# Patient Record
Sex: FEMALE | Race: BLACK OR AFRICAN AMERICAN | Employment: FULL TIME | URBAN - METROPOLITAN AREA
[De-identification: names, ages, dates, MRNs, and addresses within clinical notes are randomized per-mention and may not be internally consistent; named-entity substitution may affect disease eponyms.]

---
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
## 2022-01-10 ENCOUNTER — HOSPITAL ENCOUNTER (EMERGENCY)
Facility: HOSPITAL | Age: 22
Discharge: HOME | End: 2022-01-10
Payer: MEDICAID

## 2022-01-10 VITALS
WEIGHT: 154 LBS | HEIGHT: 67 IN | HEART RATE: 85 BPM | BODY MASS INDEX: 24.17 KG/M2 | TEMPERATURE: 97 F | RESPIRATION RATE: 18 BRPM | OXYGEN SATURATION: 96 % | DIASTOLIC BLOOD PRESSURE: 73 MMHG | SYSTOLIC BLOOD PRESSURE: 132 MMHG

## 2022-01-10 DIAGNOSIS — J02.0 STREPTOCOCCAL SORE THROAT: Primary | ICD-10-CM

## 2022-01-10 LAB
B-HCG UR QL: NEGATIVE
S PYO AG THROAT QL: POSITIVE

## 2022-01-10 PROCEDURE — 87880 STREP A ASSAY W/OPTIC: CPT

## 2022-01-10 PROCEDURE — 81025 URINE PREGNANCY TEST: CPT

## 2022-01-10 PROCEDURE — 99283 EMERGENCY DEPT VISIT LOW MDM: CPT

## 2022-01-10 RX ORDER — AZITHROMYCIN 500 MG/1
500 TABLET, FILM COATED ORAL DAILY
Qty: 5 TABLET | Refills: 0 | Status: SHIPPED | OUTPATIENT
Start: 2022-01-10 | End: 2022-01-15

## 2022-01-10 RX ORDER — ACETAMINOPHEN 500 MG
1000 TABLET ORAL ONCE
Status: COMPLETED | OUTPATIENT
Start: 2022-01-10 | End: 2022-01-10

## 2022-01-11 NOTE — ED PROVIDER NOTES
Patient Seen in: [facility] AND [facility] Emergency Department      History   No chief complaint on file. Stated Complaint: sore throat     Subjective:   HPI    51-year-old female presents the emergency department for evaluation.   Patient states for the past external ear normal.      Nose: Nose normal.      Mouth/Throat:      Mouth: Mucous membranes are moist.      Pharynx: Oropharynx is clear. Posterior oropharyngeal erythema present.       Comments: No swelling  Eyes:      Extraocular Movements: Extraocular m sore throat  (primary encounter diagnosis)     Disposition:  Discharge  1/10/2022  7:08 pm    Follow-up:  Mo Mello  [address] [place] [address] [phone]    In 2 days  ER follow up          Medications Prescribed:  Current

## 2023-11-27 ENCOUNTER — HOSPITAL ENCOUNTER (EMERGENCY)
Facility: HOSPITAL | Age: 23
Discharge: HOME | End: 2023-11-27
Attending: EMERGENCY MEDICINE
Payer: MEDICAID

## 2023-11-27 ENCOUNTER — APPOINTMENT (OUTPATIENT)
Dept: GENERAL RADIOLOGY | Facility: HOSPITAL | Age: 23
End: 2023-11-27
Attending: EMERGENCY MEDICINE
Payer: MEDICAID

## 2023-11-27 VITALS
DIASTOLIC BLOOD PRESSURE: 72 MMHG | RESPIRATION RATE: 18 BRPM | OXYGEN SATURATION: 99 % | HEART RATE: 86 BPM | TEMPERATURE: 97 F | SYSTOLIC BLOOD PRESSURE: 126 MMHG

## 2023-11-27 DIAGNOSIS — J18.9 ATYPICAL PNEUMONIA: Primary | ICD-10-CM

## 2023-11-27 LAB — B-HCG UR QL: NEGATIVE

## 2023-11-27 PROCEDURE — 71045 X-RAY EXAM CHEST 1 VIEW: CPT | Performed by: EMERGENCY MEDICINE

## 2023-11-27 PROCEDURE — 99283 EMERGENCY DEPT VISIT LOW MDM: CPT

## 2023-11-27 PROCEDURE — 81025 URINE PREGNANCY TEST: CPT

## 2023-11-27 PROCEDURE — 99284 EMERGENCY DEPT VISIT MOD MDM: CPT

## 2023-11-27 RX ORDER — DOXYCYCLINE HYCLATE 100 MG/1
100 CAPSULE ORAL 2 TIMES DAILY
Qty: 14 CAPSULE | Refills: 0 | Status: SHIPPED | OUTPATIENT
Start: 2023-11-27 | End: 2023-12-04

## 2023-11-27 RX ORDER — PREDNISONE 20 MG/1
40 TABLET ORAL DAILY
Qty: 10 TABLET | Refills: 0 | Status: SHIPPED | OUTPATIENT
Start: 2023-11-27 | End: 2023-12-02

## 2023-11-27 NOTE — ED INITIAL ASSESSMENT (HPI)
Patient arrives to ER for evaluation of Cough. Patient tested positive for Covid-19 on 11/21. Patient states the cough has been consistent and she noticed today some bright red blood in her sputum. Endorses throat irritation.

## 2024-01-28 ENCOUNTER — HOSPITAL ENCOUNTER (EMERGENCY)
Facility: HOSPITAL | Age: 24
Discharge: HOME | End: 2024-01-28
Attending: EMERGENCY MEDICINE
Payer: MEDICAID

## 2024-01-28 VITALS
RESPIRATION RATE: 20 BRPM | SYSTOLIC BLOOD PRESSURE: 117 MMHG | HEIGHT: 67 IN | HEART RATE: 89 BPM | TEMPERATURE: 98 F | WEIGHT: 166 LBS | DIASTOLIC BLOOD PRESSURE: 70 MMHG | BODY MASS INDEX: 26.06 KG/M2 | OXYGEN SATURATION: 99 %

## 2024-01-28 DIAGNOSIS — M54.50 BACK PAIN, LUMBOSACRAL: ICD-10-CM

## 2024-01-28 DIAGNOSIS — N30.90 CYSTITIS: Primary | ICD-10-CM

## 2024-01-28 LAB
B-HCG UR QL: NEGATIVE
BILIRUB UR QL: NEGATIVE
GLUCOSE UR-MCNC: NORMAL MG/DL
HGB UR QL STRIP.AUTO: NEGATIVE
KETONES UR-MCNC: NEGATIVE MG/DL
LEUKOCYTE ESTERASE UR QL STRIP.AUTO: NEGATIVE
NITRITE UR QL STRIP.AUTO: NEGATIVE
PH UR: 8 [PH] (ref 5–8)
PROT UR-MCNC: NEGATIVE MG/DL
SP GR UR STRIP: 1.02 (ref 1–1.03)
UROBILINOGEN UR STRIP-ACNC: NORMAL

## 2024-01-28 PROCEDURE — 99284 EMERGENCY DEPT VISIT MOD MDM: CPT

## 2024-01-28 PROCEDURE — 81001 URINALYSIS AUTO W/SCOPE: CPT | Performed by: EMERGENCY MEDICINE

## 2024-01-28 PROCEDURE — 81025 URINE PREGNANCY TEST: CPT

## 2024-01-28 PROCEDURE — 81001 URINALYSIS AUTO W/SCOPE: CPT

## 2024-01-28 PROCEDURE — 99283 EMERGENCY DEPT VISIT LOW MDM: CPT

## 2024-01-28 RX ORDER — NITROFURANTOIN 25; 75 MG/1; MG/1
100 CAPSULE ORAL 2 TIMES DAILY
Qty: 6 CAPSULE | Refills: 0 | Status: SHIPPED | OUTPATIENT
Start: 2024-01-28 | End: 2024-01-31

## 2024-01-28 RX ORDER — KETOROLAC TROMETHAMINE 10 MG/1
10 TABLET, FILM COATED ORAL EVERY 6 HOURS PRN
Qty: 15 TABLET | Refills: 0 | Status: SHIPPED | OUTPATIENT
Start: 2024-01-28 | End: 2024-02-04

## 2024-01-28 NOTE — ED INITIAL ASSESSMENT (HPI)
Pt reports having lower back pain and frequent urination that started 2 weeks ago. Pt report no fever,N/V.

## 2024-01-29 NOTE — ED PROVIDER NOTES
Patient Seen in: [facility] Emergency Department    History     Chief Complaint   Patient presents with    Back Pain    Urinary Symptoms     Stated Complaint: back pain, frequent urination    HPI    Patient complains of urinary frequency, urgency  that began couple days ago.  Patient denies fever or chills, no vomiting, co b back pain.  Patient denies abdominal pain.     Past Medical History:   Diagnosis Date    Asthma        Past Surgical History:   Procedure Laterality Date    FRACTURE SURGERY              History reviewed. No pertinent family history.    Social History     Socioeconomic History    Marital status: Single   Tobacco Use    Smoking status: Never    Smokeless tobacco: Never   Vaping Use    Vaping Use: Every day   Substance and Sexual Activity    Alcohol use: Yes     Comment: Socially    Drug use: Yes     Types: Cannabis     Comment: socially       Review of Systems    Positive for stated complaint: back pain, frequent urination  Other systems are as noted in HPI.  Constitutional and vital signs reviewed.      All other systems reviewed and negative except as noted above.    PSFH elements reviewed from today and agreed except as otherwise stated in HPI.    Physical Exam     ED Triage Vitals [01/28/24 1712]   /70   Pulse 89   Resp 20   Temp 97.9 °F (36.6 °C)   Temp src Temporal   SpO2 99 %   O2 Device None (Room air)       Current:/70   Pulse 89   Temp 97.9 °F (36.6 °C) (Temporal)   Resp 20   Ht 170.2 cm (5' 7\")   Wt 75.3 kg   SpO2 99%   BMI 26.00 kg/m²   GENERAL: alert, no distress  SKIN: good skin turgor, no rashes  HEENT: atraumatic, normocephalic, ears and throat are clear  EYES: sclera non icteric, conjunctiva non-injected  NECK: supple,  LUNGS:no resp distress  CARDIO:regular rate  EXTREMITIES: no cyanosis, clubbing or edema  BACK: no CVA tenderness  GI: soft, mild suprapubic tenderness, no guarding      ED Course     Labs Reviewed   URINALYSIS WITH CULTURE REFLEX -  Abnormal; Notable for the following components:       Result Value    Clarity Urine Ex.Turbid (*)     Squamous Epi. Cells Few (*)     All other components within normal limits   POCT PREGNANCY URINE - Normal       MDM       Medical Decision Making  Problems Addressed:  Back pain, lumbosacral: acute illness or injury  Cystitis: acute illness or injury    Amount and/or Complexity of Data Reviewed  Labs: ordered. Decision-making details documented in ED Course.  Discussion of management or test interpretation with external provider(s): Tylenol, motrin recommended.      Risk  OTC drugs.  Prescription drug management.            Disposition and Plan     Clinical Impression:  1. Cystitis    2. Back pain, lumbosacral        Disposition:  Discharge    Follow-up:  Gamaliel Pozo  [address] [address]  [phone]    Follow up        Medications Prescribed:  Discharge Medication List as of 1/28/2024  6:21 PM        START taking these medications    Details   Ketorolac Tromethamine 10 MG Oral Tab Take 1 tablet (10 mg total) by mouth every 6 (six) hours as needed for Pain., Normal, Disp-15 tablet, R-0      nitrofurantoin monohydrate macro 100 MG Oral Cap Take 1 capsule (100 mg total) by mouth 2 (two) times daily for 3 days., Normal, Disp-6 capsule, R-0

## 2025-04-02 ENCOUNTER — HOSPITAL ENCOUNTER (EMERGENCY)
Facility: HOSPITAL | Age: 25
Discharge: HOME | End: 2025-04-02
Attending: EMERGENCY MEDICINE
Payer: COMMERCIAL

## 2025-04-02 ENCOUNTER — APPOINTMENT (OUTPATIENT)
Dept: GENERAL RADIOLOGY | Facility: HOSPITAL | Age: 25
End: 2025-04-02
Payer: COMMERCIAL

## 2025-04-02 VITALS
DIASTOLIC BLOOD PRESSURE: 74 MMHG | HEART RATE: 80 BPM | WEIGHT: 184 LBS | RESPIRATION RATE: 18 BRPM | SYSTOLIC BLOOD PRESSURE: 127 MMHG | HEIGHT: 67 IN | OXYGEN SATURATION: 100 % | BODY MASS INDEX: 28.88 KG/M2 | TEMPERATURE: 99 F

## 2025-04-02 DIAGNOSIS — J06.9 UPPER RESPIRATORY TRACT INFECTION, UNSPECIFIED TYPE: ICD-10-CM

## 2025-04-02 DIAGNOSIS — J45.21 MILD INTERMITTENT ASTHMA WITH EXACERBATION (HCC): Primary | ICD-10-CM

## 2025-04-02 LAB
ATRIAL RATE: 71 BPM
FLUAV + FLUBV RNA SPEC NAA+PROBE: NEGATIVE
FLUAV + FLUBV RNA SPEC NAA+PROBE: NEGATIVE
P AXIS: 41 DEGREES
P-R INTERVAL: 184 MS
Q-T INTERVAL: 372 MS
QRS DURATION: 96 MS
QTC CALCULATION (BEZET): 404 MS
R AXIS: 0 DEGREES
RSV RNA SPEC NAA+PROBE: NEGATIVE
SARS-COV-2 RNA RESP QL NAA+PROBE: NOT DETECTED
T AXIS: 16 DEGREES
VENTRICULAR RATE: 71 BPM

## 2025-04-02 PROCEDURE — 99284 EMERGENCY DEPT VISIT MOD MDM: CPT

## 2025-04-02 PROCEDURE — 71045 X-RAY EXAM CHEST 1 VIEW: CPT

## 2025-04-02 PROCEDURE — 0241U SARS-COV-2/FLU A AND B/RSV BY PCR (GENEXPERT): CPT

## 2025-04-02 PROCEDURE — 0241U SARS-COV-2/FLU A AND B/RSV BY PCR (GENEXPERT): CPT | Performed by: EMERGENCY MEDICINE

## 2025-04-02 PROCEDURE — 93010 ELECTROCARDIOGRAM REPORT: CPT

## 2025-04-02 PROCEDURE — 94640 AIRWAY INHALATION TREATMENT: CPT

## 2025-04-02 PROCEDURE — 93005 ELECTROCARDIOGRAM TRACING: CPT

## 2025-04-02 RX ORDER — IPRATROPIUM BROMIDE AND ALBUTEROL SULFATE 2.5; .5 MG/3ML; MG/3ML
3 SOLUTION RESPIRATORY (INHALATION) EVERY 6 HOURS PRN
Status: DISCONTINUED | OUTPATIENT
Start: 2025-04-02 | End: 2025-04-02

## 2025-04-02 RX ORDER — PREDNISONE 20 MG/1
40 TABLET ORAL DAILY
Qty: 4 TABLET | Refills: 0 | Status: SHIPPED | OUTPATIENT
Start: 2025-04-02 | End: 2025-04-04

## 2025-04-02 RX ORDER — PREDNISONE 20 MG/1
40 TABLET ORAL ONCE
Status: COMPLETED | OUTPATIENT
Start: 2025-04-02 | End: 2025-04-02

## 2025-04-02 NOTE — ED PROVIDER NOTES
Patient Seen in: [facility] Emergency Department      History     Chief Complaint   Patient presents with    Cough/URI     Stated Complaint: cough,sob,asthma,fever    Subjective:   24-year-old female with asthma here stating about 4 days ago she got an illness associated with fever, myalgias and cough.  She is still coughing but the fevers gone.  In the last couple days she started getting wheezing and shortness of breath and tightness in her lungs.  She was trying her inhaler but did not seem to be helping much so she came in.  She does vape nicotine.  Denies tobacco or other cardiac risk factors.  No family history disease.  She denies having to be admitted for asthma in the past.  No recent steroids.  She does not believe she is pregnant              Objective:     Past Medical History:    Asthma (HCC)              Past Surgical History:   Procedure Laterality Date    Fracture surgery                  Social History     Socioeconomic History    Marital status: Single   Tobacco Use    Smoking status: Never    Smokeless tobacco: Never   Vaping Use    Vaping status: Every Day   Substance and Sexual Activity    Alcohol use: Yes     Comment: Socially    Drug use: Not Currently     Types: Cannabis     Comment: socially     Social Drivers of Health      [facility]    Housing Stability                  Physical Exam     ED Triage Vitals [04/02/25 1233]   /54   Pulse 81   Resp 18   Temp 98.9 °F (37.2 °C)   Temp src Temporal   SpO2 99 %   O2 Device None (Room air)       Current Vitals:   Vital Signs  BP: 115/68  Pulse: 86  Resp: 18  Temp: 98.9 °F (37.2 °C)  Temp src: Temporal    Oxygen Therapy  SpO2: 97 %  O2 Device: None (Room air)        Physical Exam  HENT:      Head: Normocephalic.      Right Ear: External ear normal.      Left Ear: External ear normal.      Nose: Nose normal.      Mouth/Throat:      Mouth: Mucous membranes are dry.    Eyes:      Extraocular Movements: Extraocular movements intact.      Pupils: Pupils are equal, round, and reactive to light.   Cardiovascular:      Rate and Rhythm: Normal rate and regular rhythm.      Pulses: Normal pulses.   Pulmonary:      Effort: Pulmonary effort is normal.      Comments: Diminished good air exchange.  A few wheezes heard.  Abdominal:      General: Abdomen is flat.      Palpations: Abdomen is soft.   Musculoskeletal:         General: Normal range of motion.      Cervical back: Normal range of motion.   Skin:     General: Skin is warm.      Capillary Refill: Capillary refill takes less than 2 seconds.   Neurological:      Mental Status: She is alert.      Sensory: No sensory deficit.      Motor: No weakness.             ED Course     Labs Reviewed   SARS-COV-2/FLU A AND B/RSV BY PCR (GENEXPERT) - Normal    Narrative:     This test is intended for the qualitative detection and differentiation of SARS-CoV-2, influenza A, influenza B, and respiratory syncytial virus (RSV) viral RNA in nasopharyngeal or nares swabs from individuals suspected of respiratory viral infection consistent with COVID-19 by their healthcare provider. Signs and symptoms of respiratory viral infection due to SARS-CoV-2, influenza, and RSV can be similar.    Test performed using the Xpert Xpress SARS-CoV-2/FLU/RSV (real time RT-PCR)  assay on the GeneXpert instrument, [employer], [place] [address].   This test is being used under the Food and Drug Administration's Emergency Use Authorization.    The authorized Fact Sheet for Healthcare Providers for this assay is available upon request from the laboratory.     EKG    Rate, intervals and axes as noted on EKG Report.  Rate: 71  Rhythm: Sinus Rhythm  Reading: EKG normal sinus rhythm and rate.  Normal axis and intervals.  Normal ST segments.  This EKG was interpreted by me.  Normal           ED Course as of 04/02/25  1610  ------------------------------------------------------------  Time: 04/02 1425  Comment: GEN expert independently interpreted by me is negative.  Chest x-ray showed no infiltrate independently interpreted by me.  EKG was normal.  I discussed with respiratory to give her a spacer.  Her breath sounds are improved after nebulizer.  I will give her a dose of prednisone here and for 2 more days.  She said she does not need a refill on her MDI.  Will continue to observe her to make sure she does not require additional nebulizers.  ------------------------------------------------------------  Time: 04/02 1555  Comment: Chest x-ray independently turbid by me is negative.  I just reexamined the patient.  Lungs are clear.  She was sleeping when I arrived to the room and looks comfortable.  She is comfortable going home.              [facility]      XR CHEST AP PORTABLE  (CPT=71045)    Result Date: 4/2/2025  CONCLUSION: Normal examination.     Dictated by (CST): Eldon Crawford MD on 4/02/2025 at 3:14 PM     Finalized by (CST): Eldon Crawford MD on 4/02/2025 at 3:14 PM                 Medical Decision Making  Patient with cough, congestion, fever, shortness of breath and wheezing.  Differential could include was not limited to PE, pneumonia, asthma exacerbation, bronchospasm, pleural effusion, pneumothorax and many other possibilities.  Workup in progress.  Nebulizer being given.  Pulse ox normal 97% room air.  Heart rate normal.  No DVT risk such as trauma or travel or history.    Amount and/or Complexity of Data Reviewed  External Data Reviewed: notes.     Details: No recent ER visits or asthma  Labs: ordered. Decision-making details documented in ED Course.  Radiology: ordered and independent interpretation performed. Decision-making details documented in ED Course.  ECG/medicine tests: ordered and independent interpretation performed. Decision-making details documented in ED Course.  Discussion of management or test  interpretation with external provider(s): See ED course for discussion    Risk  Prescription drug management.        Disposition and Plan     Clinical Impression:  1. Mild intermittent asthma with exacerbation (HCC)    2. Upper respiratory tract infection, unspecified type         Disposition:  Discharge  4/2/2025  4:08 pm    Follow-up:  Gamaliel Pozo  [address] [address]  [phone]    Follow up            Medications Prescribed:  Current Discharge Medication List        START taking these medications    Details   predniSONE 20 MG Oral Tab Take 2 tablets (40 mg total) by mouth daily for 2 days.  Qty: 4 tablet, Refills: 0                 Supplementary Documentation:

## 2025-04-02 NOTE — DISCHARGE INSTRUCTIONS
Prednisone for 2 more days starting tomorrow.  Albuterol 2 puffs every 4 hours for the next 2 days and then as needed.  Follow-up with your doctor in 2 days for reevaluation.  Return to the ER for changes worsening in renal instructions.

## 2025-04-02 NOTE — ED INITIAL ASSESSMENT (HPI)
Pt to ER c/o fever, chills, body aches and shortness of breath x approximately 2 days. Denies pain now.

## (undated) NOTE — ED AVS SNAPSHOT
Isai Farris   MRN: [id number]    Department:  [facility] Emergency Department   Date of Visit:  9/7/2017           Disclosure     Insurance plans vary and the physician(s) referred by the ER may not be covered by your plan.  Please contact your If you have been prescribed any medication(s), please fill your prescription right away and begin taking the medication(s) as directed    If the emergency physician has read X-rays, these will be re-interpreted by a radiologist.  If there is a significant